# Patient Record
Sex: MALE | Race: WHITE | NOT HISPANIC OR LATINO | Employment: FULL TIME | URBAN - METROPOLITAN AREA
[De-identification: names, ages, dates, MRNs, and addresses within clinical notes are randomized per-mention and may not be internally consistent; named-entity substitution may affect disease eponyms.]

---

## 2020-09-08 ENCOUNTER — OFFICE VISIT (OUTPATIENT)
Dept: URGENT CARE | Facility: CLINIC | Age: 30
End: 2020-09-08
Payer: COMMERCIAL

## 2020-09-08 VITALS
TEMPERATURE: 97.6 F | HEIGHT: 71 IN | DIASTOLIC BLOOD PRESSURE: 72 MMHG | WEIGHT: 182 LBS | BODY MASS INDEX: 25.48 KG/M2 | OXYGEN SATURATION: 96 % | RESPIRATION RATE: 18 BRPM | HEART RATE: 70 BPM | SYSTOLIC BLOOD PRESSURE: 116 MMHG

## 2020-09-08 DIAGNOSIS — S61.112A LACERATION OF LEFT THUMB WITHOUT FOREIGN BODY WITH DAMAGE TO NAIL, INITIAL ENCOUNTER: Primary | ICD-10-CM

## 2020-09-08 PROCEDURE — 99213 OFFICE O/P EST LOW 20 MIN: CPT | Performed by: PHYSICIAN ASSISTANT

## 2020-09-08 PROCEDURE — 90471 IMMUNIZATION ADMIN: CPT | Performed by: PHYSICIAN ASSISTANT

## 2020-09-08 PROCEDURE — 12001 RPR S/N/AX/GEN/TRNK 2.5CM/<: CPT | Performed by: PHYSICIAN ASSISTANT

## 2020-09-08 PROCEDURE — 90715 TDAP VACCINE 7 YRS/> IM: CPT

## 2020-09-08 RX ORDER — CEPHALEXIN 500 MG/1
500 CAPSULE ORAL EVERY 6 HOURS SCHEDULED
Qty: 12 CAPSULE | Refills: 0 | Status: SHIPPED | OUTPATIENT
Start: 2020-09-08 | End: 2020-09-11

## 2020-09-08 NOTE — PROGRESS NOTES
NAME: Angelica Granados is a 31 y o  male  : 1990    MRN: [id number]      Assessment and Plan   Laceration of left thumb without foreign body with damage to nail, initial encounter [S61 112A]  1  Laceration of left thumb without foreign body with damage to nail, initial encounter  cephalexin (KEFLEX) 500 mg capsule    TDAP Vaccine greater than or equal to 8yo    Laceration repair       Tetanus updated in clinic today  Discussed with patient unable to remove liquid bandage so we will try to clean the area that is exposed and cover that with skin glue as well  The wound is very superficial and should heal just fine  Will cover with Keflex as it was not able to be properly cleaned before closing  Discussed signs of infection with patient and he is aware  Patient Instructions   Patient Instructions   Tetanus given today   Keep clean and dry   Signs of infection follow back up       Proceed to ER if symptoms worsen  Chief Complaint     Chief Complaint   Patient presents with    Laceration     left thumb         History of Present Illness   Patient presents complaining of a laceration to left thumb  States he was cutting vegetables and accidentally caught the tip of his thumb  He reports he applied liquid bandage to the wound but wanted to get it "checked out " he reports minimal pain and denies numbness or tingling to the tip of the thumb  Unsure of his last tetanus but knows its been greater than 10 years  He reports he rinsed the area before applying the liquid bandage  He is RHD  Review of Systems   Review of Systems   Skin: Positive for wound  Current Medications       Current Outpatient Medications:     cephalexin (KEFLEX) 500 mg capsule, Take 1 capsule (500 mg total) by mouth every 6 (six) hours for 3 days, Disp: 12 capsule, Rfl: 0    Current Allergies     Allergies as of 2020    (No Known Allergies)              No past medical history on file      No past surgical history on file     No family history on file  Medications have been verified  The following portions of the patient's history were reviewed and updated as appropriate: allergies, current medications, past family history, past medical history, past social history, past surgical history and problem list     Objective   /72   Pulse 70   Temp 97 6 °F (36 4 °C)   Resp 18   Ht 5' 11" (1 803 m)   Wt 82 6 kg (182 lb)   SpO2 96%   BMI 25 38 kg/m²        Laceration repair    Date/Time: 9/9/2020 8:32 AM  Performed by: Celina Phipps PA-C  Authorized by: Celina Phipps PA-C   Consent: Verbal consent obtained  Consent given by: patient  Patient identity confirmed: verbally with patient  Body area: upper extremity  Location details: left thumb  Laceration length: 1 cm  Foreign bodies: no foreign bodies      Procedure Details:  Skin closure: glue  Approximation difficulty: simple  Patient tolerance: Patient tolerated the procedure well with no immediate complications  Comments: Unable to fully clean- patient applied liquid bandage to the area, unable to remove  Washed the one exposed area and applied dermabond  Physical Exam     Physical Exam  Vitals signs and nursing note reviewed  Constitutional:       General: He is not in acute distress  Appearance: Normal appearance  He is not ill-appearing, toxic-appearing or diaphoretic  Skin:     Comments: 1 cm transverse superficial laceration to the radial side of the left thumb  Small portion of the tip of the nail lacerated- nail bed is not  No foreign bodies  Liquid bandage is in place and one small part of the wound is still exposed  NTTP  Full AROM without pain  Full strength against resistance  Sensation intact  Capillary refill <2 seconds  Neurological:      Mental Status: He is alert

## 2020-09-23 ENCOUNTER — NURSE TRIAGE (OUTPATIENT)
Dept: OTHER | Facility: OTHER | Age: 30
End: 2020-09-23

## 2020-09-23 DIAGNOSIS — Z20.822 ENCOUNTER FOR LABORATORY TESTING FOR COVID-19 VIRUS: ICD-10-CM

## 2020-09-23 DIAGNOSIS — Z20.822 ENCOUNTER FOR LABORATORY TESTING FOR COVID-19 VIRUS: Primary | ICD-10-CM

## 2020-09-23 PROCEDURE — U0003 INFECTIOUS AGENT DETECTION BY NUCLEIC ACID (DNA OR RNA); SEVERE ACUTE RESPIRATORY SYNDROME CORONAVIRUS 2 (SARS-COV-2) (CORONAVIRUS DISEASE [COVID-19]), AMPLIFIED PROBE TECHNIQUE, MAKING USE OF HIGH THROUGHPUT TECHNOLOGIES AS DESCRIBED BY CMS-2020-01-R: HCPCS | Performed by: FAMILY MEDICINE

## 2020-09-23 NOTE — TELEPHONE ENCOUNTER
Regarding: COVID - Symptomatic  ----- Message from [facility] sent at 9/23/2020 12:00 PM EDT -----  "I want to see what I can do to get tested"  Pt has a headache, sore throat, runny nose, mucus and a cough but no fever  They started Monday and yesterday afternoon was when they got worse    Not sure if exposed was in a public event last week

## 2020-09-23 NOTE — TELEPHONE ENCOUNTER
Reason for Disposition   COVID-19 Testing, questions about    Answer Assessment - Initial Assessment Questions  1  COVID-19 DIAGNOSIS: "Who made your Coronavirus (COVID-19) diagnosis?" "Was it confirmed by a positive lab test?" If not diagnosed by a HCP, ask "Are there lots of cases (community spread) where you live?" (See public health department website, if unsure)      n/a  2  ONSET: "When did the COVID-19 symptoms start?"       2 days ago  3  WORST SYMPTOM: "What is your worst symptom?" (e g , cough, fever, shortness of breath, muscle aches)      None are worse  4  COUGH: "Do you have a cough?" If so, ask: "How bad is the cough?"        yes  5  FEVER: "Do you have a fever?" If so, ask: "What is your temperature, how was it measured, and when did it start?"      no  6  RESPIRATORY STATUS: "Describe your breathing?" (e g , shortness of breath, wheezing, unable to speak)       Slight rt chest congestion  7  BETTER-SAME-WORSE: "Are you getting better, staying the same or getting worse compared to yesterday?"  If getting worse, ask, "In what way?"      Worse increased symptoms  8  HIGH RISK DISEASE: "Do you have any chronic medical problems?" (e g , asthma, heart or lung disease, weak immune system, etc )      Sport rt asthma  9  PREGNANCY: "Is there any chance you are pregnant?" "When was your last menstrual period?"      n/a  10   OTHER SYMPTOMS: "Do you have any other symptoms?"  (e g , chills, fatigue, headache, loss of smell or taste, muscle pain, sore throat)        Headaches cough chest congestion sore throat achy muscles    Protocols used: CORONAVIRUS (COVID-19) DIAGNOSED OR SUSPECTED-ADULT-[place]

## 2020-09-24 LAB — SARS-COV-2 RNA SPEC QL NAA+PROBE: NOT DETECTED

## 2020-09-25 ENCOUNTER — TELEPHONE (OUTPATIENT)
Dept: OTHER | Facility: OTHER | Age: 30
End: 2020-09-25

## 2020-09-25 NOTE — TELEPHONE ENCOUNTER
Your test for COVID-19, also known as novel coronavirus, came back negative  You do not have COVID-19  If you have any additional questions, we can schedule a virtual visit for you with a provider or [facility] [phone] Option 7 for care advice  For additional information , please visit the Coronavirus FAQ on the [id number] [address]  ([facility])

## 2021-04-27 ENCOUNTER — IMMUNIZATIONS (OUTPATIENT)
Dept: FAMILY MEDICINE CLINIC | Facility: HOSPITAL | Age: 31
End: 2021-04-27

## 2021-04-27 DIAGNOSIS — Z23 ENCOUNTER FOR IMMUNIZATION: Primary | ICD-10-CM

## 2021-04-27 PROCEDURE — 0001A SARS-COV-2 / COVID-19 MRNA VACCINE (PFIZER-BIONTECH) 30 MCG: CPT | Performed by: NURSE PRACTITIONER

## 2021-04-27 PROCEDURE — 91300 SARS-COV-2 / COVID-19 MRNA VACCINE (PFIZER-BIONTECH) 30 MCG: CPT | Performed by: NURSE PRACTITIONER

## 2021-05-26 ENCOUNTER — IMMUNIZATIONS (OUTPATIENT)
Dept: FAMILY MEDICINE CLINIC | Facility: HOSPITAL | Age: 31
End: 2021-05-26

## 2021-05-26 DIAGNOSIS — Z23 ENCOUNTER FOR IMMUNIZATION: Primary | ICD-10-CM

## 2021-05-26 PROCEDURE — 0002A SARS-COV-2 / COVID-19 MRNA VACCINE (PFIZER-BIONTECH) 30 MCG: CPT

## 2021-05-26 PROCEDURE — 91300 SARS-COV-2 / COVID-19 MRNA VACCINE (PFIZER-BIONTECH) 30 MCG: CPT

## 2021-06-12 ENCOUNTER — TELEPHONE (OUTPATIENT)
Dept: OTHER | Facility: OTHER | Age: 31
End: 2021-06-12

## 2021-06-14 NOTE — TELEPHONE ENCOUNTER
Spoke with patient and scheduled him for 8-20-21 with Dr Katie Degroot in the [facility]

## 2021-08-20 ENCOUNTER — OFFICE VISIT (OUTPATIENT)
Dept: UROLOGY | Facility: HOSPITAL | Age: 31
End: 2021-08-20
Payer: COMMERCIAL

## 2021-08-20 VITALS
SYSTOLIC BLOOD PRESSURE: 132 MMHG | BODY MASS INDEX: 29.26 KG/M2 | HEIGHT: 71 IN | DIASTOLIC BLOOD PRESSURE: 82 MMHG | HEART RATE: 62 BPM | WEIGHT: 209 LBS

## 2021-08-20 DIAGNOSIS — Z30.09 STERILIZATION CONSULT: Primary | ICD-10-CM

## 2021-08-20 PROCEDURE — 3008F BODY MASS INDEX DOCD: CPT | Performed by: UROLOGY

## 2021-08-20 PROCEDURE — 99204 OFFICE O/P NEW MOD 45 MIN: CPT | Performed by: UROLOGY

## 2021-08-20 RX ORDER — ALPRAZOLAM 1 MG/1
TABLET ORAL
Qty: 1 TABLET | Refills: 0 | Status: SHIPPED | OUTPATIENT
Start: 2021-08-20

## 2021-08-20 RX ORDER — HYDROCODONE BITARTRATE AND ACETAMINOPHEN 5; 325 MG/1; MG/1
TABLET ORAL
Qty: 10 TABLET | Refills: 0 | Status: SHIPPED | OUTPATIENT
Start: 2021-08-20 | End: 2021-12-16 | Stop reason: SDUPTHER

## 2021-08-20 NOTE — PROGRESS NOTES
HISTORY:        This patient has 3 children and would like to have a vasectomy  He and his wife or partner are sure they want no more children, and are aware that vasectomy is intended to be a permanent form of sterilization  He has been told and understands the following: We will order a semen analysis to check for sterility after three months, and that he must use protection during that time  No guarantee can be made of permanent sterility, and that failure of the procedure is not common, but can occur  Furthermore, spontaneous reestablishment of the flow sperm is quite rare but is a possible reason for failure of the procedure  Although it is not mandatory, if he wants to request another semen sample at any time in the future, to ensure continued sterility, he can do so, at his decision  Potential complications of the procedure include, but are not limited to:  Excessive bleeding which can cause significant swelling; infections; chronic lingering pain of the testicle; damage to the blood supply of the testicle, which might lead to testicular atrophy  The patient has told me he understands the above statements, and wishes to proceed with scheduling the vasectomy  The following portions of the patient's history were reviewed and updated as appropriate: allergies, current medications, past family history, past medical history, past social history, past surgical history and problem list     Review of Systems   All other systems reviewed and are negative  Objective:     Physical Exam  Constitutional:       General: He is not in acute distress  Appearance: He is well-developed  He is not diaphoretic  HENT:      Head: Normocephalic and atraumatic  Eyes:      General: No scleral icterus  Pulmonary:      Effort: Pulmonary effort is normal    Genitourinary:     Comments: Penis testes normal  Skin:     Coloration: Skin is not pale     Neurological: Mental Status: He is alert and oriented to person, place, and time  Psychiatric:         Behavior: Behavior normal          Thought Content: Thought content normal          Judgment: Judgment normal            No results found for: PSA]  No results found for: BUN  No results found for: CREATININE  No components found for: CBC      There is no problem list on file for this patient  Diagnoses and all orders for this visit:    Sterilization consult  -     HYDROcodone-acetaminophen (NORCO) 5-325 mg per tablet; 1-2 tab every 6 hr if needed for pain  -     ALPRAZolam (XANAX) 1 mg tablet; 1-2 hr before procedure           Patient ID: Brit Rivero is a 27 y o  male  Current Outpatient Medications:     ALPRAZolam (XANAX) 1 mg tablet, 1-2 hr before procedure, Disp: 1 tablet, Rfl: 0    HYDROcodone-acetaminophen (NORCO) 5-325 mg per tablet, 1-2 tab every 6 hr if needed for pain, Disp: 10 tablet, Rfl: 0    History reviewed  No pertinent past medical history  History reviewed  No pertinent surgical history      Social History

## 2021-12-16 DIAGNOSIS — Z30.09 STERILIZATION CONSULT: ICD-10-CM

## 2021-12-17 ENCOUNTER — PROCEDURE VISIT (OUTPATIENT)
Dept: UROLOGY | Facility: HOSPITAL | Age: 31
End: 2021-12-17
Payer: COMMERCIAL

## 2021-12-17 VITALS
WEIGHT: 191 LBS | BODY MASS INDEX: 26.74 KG/M2 | HEART RATE: 70 BPM | HEIGHT: 71 IN | SYSTOLIC BLOOD PRESSURE: 118 MMHG | DIASTOLIC BLOOD PRESSURE: 68 MMHG

## 2021-12-17 DIAGNOSIS — Z30.2 ENCOUNTER FOR VASECTOMY: Primary | ICD-10-CM

## 2021-12-17 PROCEDURE — 88302 TISSUE EXAM BY PATHOLOGIST: CPT | Performed by: PATHOLOGY

## 2021-12-17 PROCEDURE — 55250 REMOVAL OF SPERM DUCT(S): CPT | Performed by: UROLOGY

## 2021-12-17 RX ORDER — HYDROCODONE BITARTRATE AND ACETAMINOPHEN 5; 325 MG/1; MG/1
TABLET ORAL
Qty: 6 TABLET | Refills: 0 | Status: SHIPPED | OUTPATIENT
Start: 2021-12-17

## 2021-12-20 RX ORDER — HYDROCODONE BITARTRATE AND ACETAMINOPHEN 5; 325 MG/1; MG/1
TABLET ORAL
Qty: 10 TABLET | Refills: 0 | Status: SHIPPED | OUTPATIENT
Start: 2021-12-20

## 2022-01-05 ENCOUNTER — PROCEDURE VISIT (OUTPATIENT)
Dept: UROLOGY | Facility: HOSPITAL | Age: 32
End: 2022-01-05

## 2022-01-05 VITALS
HEIGHT: 71 IN | SYSTOLIC BLOOD PRESSURE: 118 MMHG | HEART RATE: 70 BPM | DIASTOLIC BLOOD PRESSURE: 68 MMHG | BODY MASS INDEX: 26.74 KG/M2 | WEIGHT: 191 LBS

## 2022-01-05 DIAGNOSIS — N46.9 MALE STERILITY: Primary | ICD-10-CM

## 2022-01-05 PROCEDURE — 99024 POSTOP FOLLOW-UP VISIT: CPT

## 2022-01-05 NOTE — PROGRESS NOTES
1/5/2022  [facility]  1990  [id number]    Diagnosis:  Chief Complaint     s/p vasectomy          Patient presents for post vasectomy follow up and wound check s/p Vasectomy on 1/5/22 with Dr Frandy Wiseman:  [facility] Semen analysis as ordered x1 written instructions provided   Continue contraception until sterility confirmed with 2 semen analyses   Call for semen analysis results   FU PRN thereafter   Patient instructed to call with any questions or concerns in the meantime  Orders Placed This Encounter   Procedures    Semen analysis, post-vasectomy        Vitals:    01/05/22 1125   BP: 118/68   Pulse: 70   Weight: 86 6 kg (191 lb)   Height: 5' 11" (1 803 m)         Assessment:  27 y o  male s/p vasectomy (12/17/2021), presents today for follow up  He denies any scrotal pain or swelling  He is doing well with no issues after surgery  Physical Exam  Gu: scrotum midline/bilateral incisions c/d/i      Sachin Serrato RN         POST VASECTOMY SPECIMEN COLLECTION  PURPOSE:   Patients are not immediately sterile following vasectomy  There is significant variation in the time necessary to clear previously produced sperm, therefore follow-up testing is necessary to assure sterility  INTRUCTIONS:  1  Confirmation of sterility requires 1 separate semen analysis  2   The FIRST sample can be submitted to the lab 6 weeks post-procedure  COLLECTION TIPS:  1  You should have at least 15 ejaculations prior to submitting the first sample  2  The specimen should be delivered to the lab within 1 hour of collection  3  You may use masturbation to collect specimen  4  Do not have any ejaculations 2 days before providing sample  5  Keep the specimen close to body temperature  Please call the office for results 1 week after  specimen submission for clearance or further instructions  Be sure to use an alternative form of birth control until instructed by office       All testing through [facility] must be scheduled, in advance, with Central Scheduling, [phone]

## 2022-02-02 ENCOUNTER — APPOINTMENT (OUTPATIENT)
Dept: LAB | Facility: HOSPITAL | Age: 32
End: 2022-02-02

## 2022-02-02 DIAGNOSIS — N46.9 MALE STERILITY: ICD-10-CM

## 2022-02-02 LAB
DEPRECATED CD4 CELLS/CD8 CELLS BLD: 1 ML
SPERM MOTILE SMN QL MICRO: NORMAL

## 2022-02-02 PROCEDURE — 89321 SEMEN ANAL SPERM DETECTION: CPT

## 2023-09-01 ENCOUNTER — TELEPHONE (OUTPATIENT)
Age: 33
End: 2023-09-01

## 2023-09-01 NOTE — TELEPHONE ENCOUNTER
Pt called and wanted to know if he needs to be checked yearly for sperm count after a vas.  He has his done in 2021    Pt can be reached at [phone]